# Patient Record
Sex: MALE | Race: WHITE | Employment: OTHER | URBAN - METROPOLITAN AREA
[De-identification: names, ages, dates, MRNs, and addresses within clinical notes are randomized per-mention and may not be internally consistent; named-entity substitution may affect disease eponyms.]

---

## 2018-08-20 ENCOUNTER — OFFICE VISIT (OUTPATIENT)
Dept: PODIATRY CLINIC | Facility: CLINIC | Age: 83
End: 2018-08-20
Payer: MEDICARE

## 2018-08-20 DIAGNOSIS — M79.674 PAIN IN TOES OF BOTH FEET: Primary | ICD-10-CM

## 2018-08-20 DIAGNOSIS — B35.1 ONYCHOMYCOSIS: ICD-10-CM

## 2018-08-20 DIAGNOSIS — M79.675 PAIN IN TOES OF BOTH FEET: Primary | ICD-10-CM

## 2018-08-20 PROCEDURE — 11721 DEBRIDE NAIL 6 OR MORE: CPT | Performed by: PODIATRIST

## 2018-08-20 PROCEDURE — 99202 OFFICE O/P NEW SF 15 MIN: CPT | Performed by: PODIATRIST

## 2018-08-20 RX ORDER — POTASSIUM CHLORIDE 600 MG/1
8 TABLET, FILM COATED, EXTENDED RELEASE ORAL DAILY
COMMUNITY

## 2018-08-20 RX ORDER — HYDROCHLOROTHIAZIDE 25 MG/1
25 TABLET ORAL DAILY
COMMUNITY

## 2018-08-20 RX ORDER — TRAMADOL HYDROCHLORIDE 50 MG/1
50 TABLET ORAL EVERY 6 HOURS PRN
COMMUNITY

## 2018-08-20 RX ORDER — AMLODIPINE BESYLATE 10 MG/1
10 TABLET ORAL DAILY
COMMUNITY

## 2018-08-20 NOTE — PROGRESS NOTES
HPI:    Patient ID: Chong Beal is a 80year old male. HPI  This pleasant 68-year-old male presents to me as a new patient to me and states he was self-referred. The reason he is here is because of pain associated with his toenails.   He states that he Visit:  No prescriptions requested or ordered in this encounter       Imaging & Referrals:  None       RE#[id number]

## 2018-12-17 ENCOUNTER — OFFICE VISIT (OUTPATIENT)
Dept: PODIATRY CLINIC | Facility: CLINIC | Age: 83
End: 2018-12-17
Payer: MEDICARE

## 2018-12-17 DIAGNOSIS — M79.675 PAIN IN TOES OF BOTH FEET: Primary | ICD-10-CM

## 2018-12-17 DIAGNOSIS — M79.674 PAIN IN TOES OF BOTH FEET: Primary | ICD-10-CM

## 2018-12-17 DIAGNOSIS — B35.1 ONYCHOMYCOSIS: ICD-10-CM

## 2018-12-17 PROCEDURE — 11721 DEBRIDE NAIL 6 OR MORE: CPT | Performed by: PODIATRIST

## 2018-12-17 RX ORDER — ASCORBATE CALCIUM 500 MG
TABLET ORAL
COMMUNITY
End: 2020-09-21 | Stop reason: ALTCHOICE

## 2018-12-17 RX ORDER — TIMOLOL MALEATE 5 MG/ML
SOLUTION OPHTHALMIC
COMMUNITY

## 2018-12-17 RX ORDER — MELATONIN: COMMUNITY

## 2018-12-17 RX ORDER — METRONIDAZOLE 7.5 MG/G
GEL TOPICAL
COMMUNITY

## 2018-12-17 RX ORDER — FELODIPINE 5 MG/1
TABLET, EXTENDED RELEASE ORAL
COMMUNITY

## 2018-12-17 RX ORDER — HYPOCHLOROUS ACID/SODIUM CHLOR 0.01 %
SPRAY, NON-AEROSOL (ML) TOPICAL
COMMUNITY
End: 2020-09-21 | Stop reason: ALTCHOICE

## 2018-12-17 NOTE — PROGRESS NOTES
HPI:    Patient ID: Anurag Carmona is a 80year old male. This 43-year-old male presents for care associated with his painful nails. He reports relief by previous treatment. He is accompanied today by his wife.       ROS:     I did review medical status complete debridement of all nails was accomplished today without incident. I reduced nail, subungual debris, and some keratosis. No ulcerations or infections were noted upon debridement.   I dissipate relief will see patient for care if and when symptoms

## 2019-04-22 ENCOUNTER — OFFICE VISIT (OUTPATIENT)
Dept: PODIATRY CLINIC | Facility: CLINIC | Age: 84
End: 2019-04-22
Payer: MEDICARE

## 2019-04-22 DIAGNOSIS — B35.1 ONYCHOMYCOSIS: ICD-10-CM

## 2019-04-22 DIAGNOSIS — M79.675 PAIN IN TOES OF BOTH FEET: Primary | ICD-10-CM

## 2019-04-22 DIAGNOSIS — M79.674 PAIN IN TOES OF BOTH FEET: Primary | ICD-10-CM

## 2019-04-22 PROCEDURE — 11721 DEBRIDE NAIL 6 OR MORE: CPT | Performed by: PODIATRIST

## 2019-04-22 RX ORDER — FLUTICASONE PROPIONATE 50 MCG
SPRAY, SUSPENSION (ML) NASAL
Refills: 1 | COMMUNITY
Start: 2019-04-08 | End: 2020-09-21 | Stop reason: ALTCHOICE

## 2019-04-22 NOTE — PROGRESS NOTES
HPI:    Patient ID: Lyla Rojas is a 80year old male. This 70-year-old male presents with recurrent pain associated with his nails. He reports relief by previous treatment.   He has no other foot related concerns today      ROS:     I did review medi exam patient has palpable discomfort due to the dystrophic nature of his toenails. His nails have thickness, subungual debris separation and change. Some clearly are worse than others there are no present signs of infection.   Patient understands his only

## 2019-08-19 ENCOUNTER — OFFICE VISIT (OUTPATIENT)
Dept: PODIATRY CLINIC | Facility: CLINIC | Age: 84
End: 2019-08-19
Payer: MEDICARE

## 2019-08-19 DIAGNOSIS — B35.1 ONYCHOMYCOSIS: ICD-10-CM

## 2019-08-19 DIAGNOSIS — M79.674 PAIN IN TOES OF BOTH FEET: Primary | ICD-10-CM

## 2019-08-19 DIAGNOSIS — M79.675 PAIN IN TOES OF BOTH FEET: Primary | ICD-10-CM

## 2019-08-19 PROCEDURE — 11721 DEBRIDE NAIL 6 OR MORE: CPT | Performed by: PODIATRIST

## 2019-08-19 NOTE — PROGRESS NOTES
HPI:    Patient ID: Kehinde Mays is a 80year old male. This 80-year-old male presents for care associated with his painful toenails. He has had relief by previous care.       ROS:   I did review medical status medications were noted and he has an [place] nature of his toenails. All nails have thickness, subungual debris separation and change associated with long-standing chronic mycosis. He does not have a nail on the first and fifth toes of the left foot and the fifth toe of the right foot.   Careful and

## 2019-12-23 ENCOUNTER — OFFICE VISIT (OUTPATIENT)
Dept: PODIATRY CLINIC | Facility: CLINIC | Age: 84
End: 2019-12-23
Payer: MEDICARE

## 2019-12-23 DIAGNOSIS — M79.674 PAIN IN TOES OF BOTH FEET: Primary | ICD-10-CM

## 2019-12-23 DIAGNOSIS — B35.1 ONYCHOMYCOSIS: ICD-10-CM

## 2019-12-23 DIAGNOSIS — M79.675 PAIN IN TOES OF BOTH FEET: Primary | ICD-10-CM

## 2019-12-23 PROCEDURE — 11721 DEBRIDE NAIL 6 OR MORE: CPT | Performed by: PODIATRIST

## 2019-12-23 NOTE — PROGRESS NOTES
HPI:    Patient ID: Shey Guillory is a 80year old male. 80-year-old male presents with recurrent pain associated with his toenails. He reports relief by previous treatment.     ROS:   I did review medical status, medications and allergies were noted relief by previous care. Pulses are noted although diminished. All nails have thickness, subungual debris separation and change associated with chronic longstanding mycosis. Patient's only option of care is periodic debridement.   I reduced all 10 toenai

## 2020-07-20 ENCOUNTER — OFFICE VISIT (OUTPATIENT)
Dept: PODIATRY CLINIC | Facility: CLINIC | Age: 85
End: 2020-07-20
Payer: MEDICARE

## 2020-07-20 DIAGNOSIS — B35.1 ONYCHOMYCOSIS: ICD-10-CM

## 2020-07-20 DIAGNOSIS — M79.675 PAIN IN TOES OF BOTH FEET: Primary | ICD-10-CM

## 2020-07-20 DIAGNOSIS — M79.674 PAIN IN TOES OF BOTH FEET: Primary | ICD-10-CM

## 2020-07-20 PROCEDURE — 11721 DEBRIDE NAIL 6 OR MORE: CPT | Performed by: PODIATRIST

## 2020-07-20 NOTE — PROGRESS NOTES
HPI:    Patient ID: Shelby Cantu is a 80year old male. This 80-year-old male presents with recurrent pain associated with his toenails. He reports relief by previous care. Have not seen him for a while because of the pandemic.       ROS:   I did revi left great toe due to previous procedure. All nails are extremely long. They have marked thickness and dystrophy consistent with chronic mycosis. There are no signs of infection. Patient understands options of treatment.   His only option presently is d

## 2020-08-24 ENCOUNTER — OFFICE VISIT (OUTPATIENT)
Dept: PODIATRY CLINIC | Facility: CLINIC | Age: 85
End: 2020-08-24
Payer: MEDICARE

## 2020-08-24 DIAGNOSIS — L84 FOOT CALLUS: ICD-10-CM

## 2020-08-24 DIAGNOSIS — M79.672 LEFT FOOT PAIN: Primary | ICD-10-CM

## 2020-08-24 PROCEDURE — 99212 OFFICE O/P EST SF 10 MIN: CPT | Performed by: PODIATRIST

## 2020-08-24 PROCEDURE — G0463 HOSPITAL OUTPT CLINIC VISIT: HCPCS | Performed by: PODIATRIST

## 2020-08-24 NOTE — PROGRESS NOTES
HPI:    Patient ID: Twin Thacker is a 80year old male. This 55-year-old male has a chief complaint of pain associated with the ball of his left foot. I saw this patient just over a month ago and cared for his toenails.   The pain that he is having n Tab CR Take 8 mEq by mouth daily.        Allergies:  Demerol Hcl [Meperi*    NAUSEA AND VOMITING   PHYSICAL EXAM:   On physical exam when asked he points to the plantar aspect of the ball of the left foot where there is a diffuse rather heavy callus sub-thi

## 2020-09-21 ENCOUNTER — OFFICE VISIT (OUTPATIENT)
Dept: PODIATRY CLINIC | Facility: CLINIC | Age: 85
End: 2020-09-21
Payer: MEDICARE

## 2020-09-21 VITALS — BODY MASS INDEX: 21.91 KG/M2 | WEIGHT: 160 LBS | HEIGHT: 71.5 IN

## 2020-09-21 DIAGNOSIS — M79.675 PAIN IN TOES OF BOTH FEET: Primary | ICD-10-CM

## 2020-09-21 DIAGNOSIS — B35.1 ONYCHOMYCOSIS: ICD-10-CM

## 2020-09-21 DIAGNOSIS — M79.674 PAIN IN TOES OF BOTH FEET: Primary | ICD-10-CM

## 2020-09-21 PROCEDURE — 11721 DEBRIDE NAIL 6 OR MORE: CPT | Performed by: PODIATRIST

## 2020-09-21 RX ORDER — AMLODIPINE BESYLATE AND BENAZEPRIL HYDROCHLORIDE 2.5; 1 MG/1; MG/1
1 CAPSULE ORAL 2 TIMES DAILY
COMMUNITY
Start: 2020-08-10

## 2020-09-21 NOTE — PROGRESS NOTES
HPI:    Patient ID: Meli Sue is a 80year old male. This 5year-old male presents with recurrent pain associated with his toenails. The last time I saw this patient was a couple of months ago.   The pain that is experiencing associated with the dys great toe due to previous procedure. Pulses are noted there is no evidence of edema nor erythema there is no signs of infection. His nails remain dystrophic with changes associated with chronic mycosis.   Careful and complete debridement of each nail was

## 2020-12-21 ENCOUNTER — OFFICE VISIT (OUTPATIENT)
Dept: PODIATRY CLINIC | Facility: CLINIC | Age: 85
End: 2020-12-21
Payer: MEDICARE

## 2020-12-21 DIAGNOSIS — M79.675 PAIN IN TOES OF BOTH FEET: Primary | ICD-10-CM

## 2020-12-21 DIAGNOSIS — M79.674 PAIN IN TOES OF BOTH FEET: Primary | ICD-10-CM

## 2020-12-21 DIAGNOSIS — B35.1 ONYCHOMYCOSIS: ICD-10-CM

## 2020-12-21 PROCEDURE — 11721 DEBRIDE NAIL 6 OR MORE: CPT | Performed by: PODIATRIST

## 2020-12-21 NOTE — PROGRESS NOTES
HPI:    Patient ID: Nestor Perales is a 80year old male. 69-year-old male presents with recurrent pain associated with his toenails. Last time I saw this patient was September 21 of this year. He reports relief by previous care.     ROS:     I did revi signs of infection there is nothing open or draining. Pulses are noted although diminished. Careful and complete debridement of each toenail was accomplished today without incident. I reduced nail, subungual debris, and some keratosis.   No ulcerations o

## 2021-04-19 ENCOUNTER — OFFICE VISIT (OUTPATIENT)
Dept: PODIATRY CLINIC | Facility: CLINIC | Age: 86
End: 2021-04-19
Payer: MEDICARE

## 2021-04-19 DIAGNOSIS — B35.1 ONYCHOMYCOSIS: ICD-10-CM

## 2021-04-19 DIAGNOSIS — M79.674 PAIN IN TOES OF BOTH FEET: Primary | ICD-10-CM

## 2021-04-19 DIAGNOSIS — M79.675 PAIN IN TOES OF BOTH FEET: Primary | ICD-10-CM

## 2021-04-19 PROCEDURE — 11721 DEBRIDE NAIL 6 OR MORE: CPT | Performed by: PODIATRIST

## 2021-04-19 NOTE — PROGRESS NOTES
HPI:    Patient ID: Mario Winters is a 80year old male. This 20-year-old male presents for care associated with his painful toenails. The last time I saw this patient was 24 December last year. He reports relief by previous care.     ROS:   I did go options of treatment. Careful and complete debridement of all 9 toenails was accomplished today without incident. I reduced nail, subungual debris, and some keratosis. No ulcerations or infections were noted upon debridement.   I anticipate relief by [facility]

## 2021-08-31 ENCOUNTER — OFFICE VISIT (OUTPATIENT)
Dept: PODIATRY CLINIC | Facility: CLINIC | Age: 86
End: 2021-08-31
Payer: MEDICARE

## 2021-08-31 DIAGNOSIS — M79.675 PAIN IN TOES OF BOTH FEET: Primary | ICD-10-CM

## 2021-08-31 DIAGNOSIS — B35.1 ONYCHOMYCOSIS: ICD-10-CM

## 2021-08-31 DIAGNOSIS — M79.674 PAIN IN TOES OF BOTH FEET: Primary | ICD-10-CM

## 2021-08-31 PROCEDURE — 11721 DEBRIDE NAIL 6 OR MORE: CPT | Performed by: PODIATRIST

## 2021-08-31 NOTE — PROGRESS NOTES
HPI:    Patient ID: Claudine Vásquez is a 80year old male. 80-year-old male presents with recurrent pain associated with all of his nails. I saw this patient about 4 months ago when he states that he had relief by previous care.       ROS:     There are n accomplished today without incident. I reduced nail, subungual debris, and some keratosis. No ulcerations or infections were noted upon debridement. Anticipate relief by this care and will see patient for treatment if and when symptoms redevelop.   He is

## 2021-12-14 ENCOUNTER — OFFICE VISIT (OUTPATIENT)
Dept: PODIATRY CLINIC | Facility: CLINIC | Age: 86
End: 2021-12-14
Payer: MEDICARE

## 2021-12-14 DIAGNOSIS — B35.1 ONYCHOMYCOSIS: ICD-10-CM

## 2021-12-14 DIAGNOSIS — M79.674 PAIN IN TOES OF BOTH FEET: Primary | ICD-10-CM

## 2021-12-14 DIAGNOSIS — M79.675 PAIN IN TOES OF BOTH FEET: Primary | ICD-10-CM

## 2021-12-14 PROCEDURE — 11721 DEBRIDE NAIL 6 OR MORE: CPT | Performed by: PODIATRIST

## 2021-12-14 NOTE — PROGRESS NOTES
This 80-year-old male presents with recurrent symptoms associated with all of his nails. The last time I saw this patient was HPI:    Patient ID: Timi Ornelas is a 80year old male. Last time I saw this patient was August 31.   He reports relief by pre and therefore today careful and complete debridement of all 10 nails. Careful and complete debridement of each nail was accomplished today without incident. I reduced nail, subungual debris, and some keratosis. No ulcerations or infections were noted.

## 2022-04-27 ENCOUNTER — OFFICE VISIT (OUTPATIENT)
Dept: PODIATRY CLINIC | Facility: CLINIC | Age: 87
End: 2022-04-27
Payer: MEDICARE

## 2022-04-27 DIAGNOSIS — M79.674 PAIN IN TOES OF BOTH FEET: Primary | ICD-10-CM

## 2022-04-27 DIAGNOSIS — B35.1 ONYCHOMYCOSIS: ICD-10-CM

## 2022-04-27 DIAGNOSIS — M79.675 PAIN IN TOES OF BOTH FEET: Primary | ICD-10-CM

## 2022-04-27 PROCEDURE — 11721 DEBRIDE NAIL 6 OR MORE: CPT | Performed by: PODIATRIST

## 2022-08-02 ENCOUNTER — OFFICE VISIT (OUTPATIENT)
Dept: PODIATRY CLINIC | Facility: CLINIC | Age: 87
End: 2022-08-02
Payer: MEDICARE

## 2022-08-02 DIAGNOSIS — B35.1 ONYCHOMYCOSIS: ICD-10-CM

## 2022-08-02 DIAGNOSIS — M79.675 PAIN IN TOES OF BOTH FEET: Primary | ICD-10-CM

## 2022-08-02 DIAGNOSIS — M79.674 PAIN IN TOES OF BOTH FEET: Primary | ICD-10-CM

## 2022-08-02 PROCEDURE — 11721 DEBRIDE NAIL 6 OR MORE: CPT | Performed by: PODIATRIST

## 2022-11-15 ENCOUNTER — OFFICE VISIT (OUTPATIENT)
Dept: PODIATRY CLINIC | Facility: CLINIC | Age: 87
End: 2022-11-15
Payer: MEDICARE

## 2022-11-15 DIAGNOSIS — M79.674 PAIN IN TOES OF BOTH FEET: Primary | ICD-10-CM

## 2022-11-15 DIAGNOSIS — M79.675 PAIN IN TOES OF BOTH FEET: Primary | ICD-10-CM

## 2022-11-15 DIAGNOSIS — B35.1 ONYCHOMYCOSIS: ICD-10-CM

## 2022-11-15 PROCEDURE — 11721 DEBRIDE NAIL 6 OR MORE: CPT | Performed by: PODIATRIST

## 2023-02-27 ENCOUNTER — OFFICE VISIT (OUTPATIENT)
Dept: PODIATRY CLINIC | Facility: CLINIC | Age: 88
End: 2023-02-27

## 2023-02-27 DIAGNOSIS — B35.1 ONYCHOMYCOSIS: ICD-10-CM

## 2023-02-27 DIAGNOSIS — M79.675 PAIN IN TOES OF BOTH FEET: Primary | ICD-10-CM

## 2023-02-27 DIAGNOSIS — M79.674 PAIN IN TOES OF BOTH FEET: Primary | ICD-10-CM

## 2023-02-27 PROCEDURE — 99213 OFFICE O/P EST LOW 20 MIN: CPT | Performed by: STUDENT IN AN ORGANIZED HEALTH CARE EDUCATION/TRAINING PROGRAM

## 2023-02-27 RX ORDER — BENAZEPRIL HYDROCHLORIDE 20 MG/1
TABLET ORAL
COMMUNITY

## 2023-02-27 RX ORDER — METOPROLOL SUCCINATE 50 MG/1
50 TABLET, EXTENDED RELEASE ORAL DAILY
COMMUNITY
Start: 2023-01-13

## 2023-07-03 ENCOUNTER — HOSPITAL ENCOUNTER (OUTPATIENT)
Dept: GENERAL RADIOLOGY | Age: 88
Discharge: HOME | End: 2023-07-03
Attending: STUDENT IN AN ORGANIZED HEALTH CARE EDUCATION/TRAINING PROGRAM
Payer: MEDICARE

## 2023-07-03 ENCOUNTER — OFFICE VISIT (OUTPATIENT)
Dept: PODIATRY CLINIC | Facility: CLINIC | Age: 88
End: 2023-07-03

## 2023-07-03 ENCOUNTER — LAB ENCOUNTER (OUTPATIENT)
Dept: LAB | Age: 88
End: 2023-07-03
Attending: STUDENT IN AN ORGANIZED HEALTH CARE EDUCATION/TRAINING PROGRAM
Payer: MEDICARE

## 2023-07-03 DIAGNOSIS — L03.115 CELLULITIS OF RIGHT FOOT: Primary | ICD-10-CM

## 2023-07-03 DIAGNOSIS — M10.071 ACUTE IDIOPATHIC GOUT OF RIGHT FOOT: ICD-10-CM

## 2023-07-03 DIAGNOSIS — L03.115 CELLULITIS OF RIGHT FOOT: ICD-10-CM

## 2023-07-03 LAB
BASOPHILS # BLD AUTO: 0.07 X10(3) UL (ref 0–0.2)
BASOPHILS NFR BLD AUTO: 0.9 %
EOSINOPHIL # BLD AUTO: 0.17 X10(3) UL (ref 0–0.7)
EOSINOPHIL NFR BLD AUTO: 2.1 %
ERYTHROCYTE [DISTWIDTH] IN BLOOD BY AUTOMATED COUNT: 12.8 %
HCT VFR BLD AUTO: 38.4 %
HGB BLD-MCNC: 11.9 G/DL
IMM GRANULOCYTES # BLD AUTO: 0.04 X10(3) UL (ref 0–1)
IMM GRANULOCYTES NFR BLD: 0.5 %
LYMPHOCYTES # BLD AUTO: 0.78 X10(3) UL (ref 1–4)
LYMPHOCYTES NFR BLD AUTO: 9.8 %
MCH RBC QN AUTO: 30.6 PG (ref 26–34)
MCHC RBC AUTO-ENTMCNC: 31 G/DL (ref 31–37)
MCV RBC AUTO: 98.7 FL
MONOCYTES # BLD AUTO: 0.87 X10(3) UL (ref 0.1–1)
MONOCYTES NFR BLD AUTO: 10.9 %
NEUTROPHILS # BLD AUTO: 6.05 X10 (3) UL (ref 1.5–7.7)
NEUTROPHILS # BLD AUTO: 6.05 X10(3) UL (ref 1.5–7.7)
NEUTROPHILS NFR BLD AUTO: 75.8 %
PLATELET # BLD AUTO: 214 10(3)UL (ref 150–450)
RBC # BLD AUTO: 3.89 X10(6)UL
URATE SERPL-MCNC: 11.3 MG/DL
WBC # BLD AUTO: 8 X10(3) UL (ref 4–11)

## 2023-07-03 PROCEDURE — 36415 COLL VENOUS BLD VENIPUNCTURE: CPT

## 2023-07-03 PROCEDURE — 85025 COMPLETE CBC W/AUTO DIFF WBC: CPT

## 2023-07-03 PROCEDURE — 84550 ASSAY OF BLOOD/URIC ACID: CPT

## 2023-07-03 PROCEDURE — 99213 OFFICE O/P EST LOW 20 MIN: CPT | Performed by: STUDENT IN AN ORGANIZED HEALTH CARE EDUCATION/TRAINING PROGRAM

## 2023-07-03 PROCEDURE — 73630 X-RAY EXAM OF FOOT: CPT | Performed by: STUDENT IN AN ORGANIZED HEALTH CARE EDUCATION/TRAINING PROGRAM

## 2023-07-03 RX ORDER — AMOXICILLIN AND CLAVULANATE POTASSIUM 875; 125 MG/1; MG/1
1 TABLET, FILM COATED ORAL 2 TIMES DAILY
Qty: 30 TABLET | Refills: 0 | Status: SHIPPED | OUTPATIENT
Start: 2023-07-03

## 2023-07-05 RX ORDER — PREDNISONE 20 MG/1
20 TABLET ORAL DAILY
Qty: 5 TABLET | Refills: 0 | Status: SHIPPED | OUTPATIENT
Start: 2023-07-05 | End: 2023-07-10

## 2023-09-01 RX ORDER — AMOXICILLIN AND CLAVULANATE POTASSIUM 875; 125 MG/1; MG/1
1 TABLET, FILM COATED ORAL 2 TIMES DAILY
Qty: 30 TABLET | Refills: 0 | OUTPATIENT
Start: 2023-09-01

## 2023-10-09 ENCOUNTER — OFFICE VISIT (OUTPATIENT)
Dept: PODIATRY CLINIC | Facility: CLINIC | Age: 88
End: 2023-10-09

## 2023-10-09 DIAGNOSIS — I87.2 VENOUS STASIS DERMATITIS OF LEFT LOWER EXTREMITY: ICD-10-CM

## 2023-10-09 DIAGNOSIS — L03.116 CELLULITIS OF LEFT FOOT: Primary | ICD-10-CM

## 2023-10-09 DIAGNOSIS — B35.1 ONYCHOMYCOSIS: ICD-10-CM

## 2023-10-09 PROCEDURE — 99213 OFFICE O/P EST LOW 20 MIN: CPT | Performed by: STUDENT IN AN ORGANIZED HEALTH CARE EDUCATION/TRAINING PROGRAM

## 2023-10-09 RX ORDER — CEPHALEXIN 500 MG/1
500 CAPSULE ORAL 3 TIMES DAILY
Qty: 21 CAPSULE | Refills: 0 | Status: SHIPPED | OUTPATIENT
Start: 2023-10-09

## 2023-10-09 RX ORDER — HYDRALAZINE HYDROCHLORIDE 25 MG/1
1 TABLET, FILM COATED ORAL 2 TIMES DAILY
COMMUNITY

## 2023-10-09 RX ORDER — METOPROLOL SUCCINATE 25 MG/1
25 TABLET, EXTENDED RELEASE ORAL DAILY
COMMUNITY
Start: 2023-09-21

## 2023-10-09 RX ORDER — ALLOPURINOL 100 MG/1
2 TABLET ORAL DAILY
COMMUNITY

## 2023-10-09 NOTE — PROGRESS NOTES
[facility] Podiatry  Progress Note      Lopez Santacruz is a 80year old male. Patient presents with:  Toenail Care: Here w/ daughter- Nail trim and foot check- also L foot check for skin thickening- rates pain 6/10 only when putting pressure on it            HPI:       Patient is a pleasant 80year-old male presents to clinic for bilateral check. Patient admits elongated toenais, left foot callus and swelling with redness to left lower extremity. He does use compression socks for edema control. The left leg has been using and there is redness, which has been improving. Allergies: Demerol Hcl [Meperidine] and Demerol    Current Outpatient Medications   Medication Sig Dispense Refill    metoprolol succinate ER 25 MG Oral Tablet 24 Hr Take 1 tablet (25 mg total) by mouth daily. hydrALAZINE 25 MG Oral Tab Take 1 tablet (25 mg total) by mouth 2 (two) times daily. allopurinol 100 MG Oral Tab Take 2 tablets (200 mg total) by mouth daily. amoxicillin clavulanate 875-125 MG Oral Tab Take 1 tablet by mouth 2 (two) times daily. (Patient not taking: Reported on 10/9/2023) 30 tablet 0    Benazepril HCl 20 MG Oral Tab benazepril 20 mg tablet (Patient not taking: Reported on 10/9/2023)      metoprolol succinate ER 50 MG Oral Tablet 24 Hr Take 1 tablet (50 mg total) by mouth daily. (Patient not taking: Reported on 10/9/2023)      amLODIPine Besy-Benazepril HCl 2.5-10 MG Oral Cap Take 1 capsule by mouth 2 (two) times daily. 20 mg tab bid (Patient not taking: Reported on 10/9/2023)      Ascorbic Acid (VITAMIN C OR) Vitamin C 500 mg tablet   Take 1 tablet every day by oral route. Timolol Maleate 0.5 % Ophthalmic Gel Forming Solution timolol maleate 0.5 % eye gel forming solution      SELENIUM OR selenium 200 mcg tablet   Take 1 tablet every day by oral route. Vitamin D3 1000 units Oral Tab Vitamin D3 1,000 unit tablet   Take 1 tablet every day by oral route.       aspirin 81 MG Oral Tab Asprin Ec Low Dose 81 mg tablet,delayed release   Take 1 tablet every day by oral route. MetroNIDAZOLE 0.75 % External Gel metronidazole 0.75 % topical gel (Patient not taking: Reported on 10/9/2023)      Felodipine ER 5 MG Oral Tablet 24 Hr felodipine ER 5 mg tablet,extended release 24 hr (Patient not taking: Reported on 10/9/2023)      hydrochlorothiazide 25 MG Oral Tab Take 1 tablet (25 mg total) by mouth daily. (Patient not taking: Reported on 10/9/2023)      TraMADol HCl 50 MG Oral Tab Take 1 tablet (50 mg total) by mouth every 6 (six) hours as needed for Pain. (Patient not taking: Reported on 10/9/2023)      Potassium Chloride ER 8 MEQ Oral Tab CR Take 1 tablet (8 mEq total) by mouth daily. (Patient not taking: Reported on 10/9/2023)        History reviewed. No pertinent past medical history. History reviewed. No pertinent surgical history. History reviewed. No pertinent family history. Social History    Socioeconomic History      Marital status:     Tobacco Use      Smoking status: Never      Smokeless tobacco: Never    Vaping Use      Vaping Use: Never used    Substance and Sexual Activity      Alcohol use: No      Drug use: No          REVIEW OF SYSTEMS:     Denies nause, fever, chills  No calf pain  Denies chest pain or SOB      EXAM:   There were no vitals taken for this visit. GENERAL: well developed, well nourished, in no apparent distress  EXTREMITIES:   1. Integument: Normal skin temperature and turgor. Erthema and warmth to left lateral lower leg with clear oozing from venous stasis. HPK tissue to plantar left foot  2. Vascular: Dorsalis pedis two out of four bilateral and posterior tibial pulses two out of   four bilateral, capillary refill normal.   3. Musculoskeletal: All muscle groups are graded 5 out of 5 in the foot and ankle.     4. Neurological: Normal sharp dull sensation; reflexes normal.             ASSESSMENT AND PLAN:   Diagnoses and all orders for this visit:    Cellulitis of left foot    Onychomycosis    Venous stasis dermatitis of left lower extremity        Plan:       -Patient examined, chart history reviewed. -Discussed importance of proper pedal hygiene, regular foot checks, and tight glucose control.  -Sharply debrided nails x10 with a sterile nail nipper achieving a 20% reduction in thickness and length, without incident.   -Pared HPK tissue with bleeding expressed post debridement. Hemostasis achieved with silver nitrate, pressure,gauze and coban.   -Left lateral leg dressed with betadine, gauze, and ace for edema control.   -Rx for mupirocin to be applied to left leg venous stasis and take oral keflex as directed  -Ambulate with supportive shoes and inserts and avoid walking barefoot.  -Educated patient on acute signs of infection advised patient to seek immediate medical attention if symptoms arise. RTC in 3 months       The patient indicates understanding of these issues and agrees to the plan.         Lucie Barone DPM

## 2024-02-05 ENCOUNTER — OFFICE VISIT (OUTPATIENT)
Dept: PODIATRY CLINIC | Facility: CLINIC | Age: 89
End: 2024-02-05

## 2024-02-05 DIAGNOSIS — R26.81 GAIT INSTABILITY: ICD-10-CM

## 2024-02-05 DIAGNOSIS — B35.1 ONYCHOMYCOSIS: Primary | ICD-10-CM

## 2024-02-05 PROCEDURE — 99213 OFFICE O/P EST LOW 20 MIN: CPT | Performed by: STUDENT IN AN ORGANIZED HEALTH CARE EDUCATION/TRAINING PROGRAM

## 2024-02-05 RX ORDER — AMLODIPINE BESYLATE 2.5 MG/1
2.5 TABLET ORAL DAILY
COMMUNITY
Start: 2023-12-04

## 2024-02-05 NOTE — PROGRESS NOTES
[facility] Podiatry  Progress Note      Vikram Covarrubias is a 89 year old male.   Chief Complaint   Patient presents with    Toenail Care     Here w/ daughter- Nail trim and foot check f/u- L foot callus check - rates pain 3-5/10 on and off             HPI:       Patient is a pleasant 89-year-old male presents to clinic for bilateral check.  Patient admits elongated toenails. Calluses bothers him mostly at night     Allergies: Demerol hcl [meperidine] and Demerol    Current Outpatient Medications   Medication Sig Dispense Refill    amLODIPine 2.5 MG Oral Tab Take 1 tablet (2.5 mg total) by mouth daily.      hydrALAZINE 25 MG Oral Tab Take 1 tablet (25 mg total) by mouth 2 (two) times daily.      allopurinol 100 MG Oral Tab Take 2 tablets (200 mg total) by mouth daily.      metoprolol succinate ER 25 MG Oral Tablet 24 Hr Take 1 tablet (25 mg total) by mouth daily. (Patient not taking: Reported on 2/5/2024)      cephalexin 500 MG Oral Cap Take 1 capsule (500 mg total) by mouth 3 (three) times daily. 21 capsule 0    mupirocin 2 % External Ointment Apply 1 Application topically 2 (two) times daily. 30 g 3    amoxicillin clavulanate 875-125 MG Oral Tab Take 1 tablet by mouth 2 (two) times daily. (Patient not taking: Reported on 10/9/2023) 30 tablet 0    Benazepril HCl 20 MG Oral Tab benazepril 20 mg tablet (Patient not taking: Reported on 10/9/2023)      metoprolol succinate ER 50 MG Oral Tablet 24 Hr Take 1 tablet (50 mg total) by mouth daily. (Patient not taking: Reported on 10/9/2023)      amLODIPine Besy-Benazepril HCl 2.5-10 MG Oral Cap Take 1 capsule by mouth 2 (two) times daily. 20 mg tab bid (Patient not taking: Reported on 10/9/2023)      Ascorbic Acid (VITAMIN C OR) Vitamin C 500 mg tablet   Take 1 tablet every day by oral route.      Timolol Maleate 0.5 % Ophthalmic Gel Forming Solution timolol maleate 0.5 % eye gel forming solution      SELENIUM OR selenium 200 mcg tablet   Take 1 tablet every day by oral  route.      Vitamin D3 1000 units Oral Tab Vitamin D3 1,000 unit tablet   Take 1 tablet every day by oral route.      aspirin 81 MG Oral Tab Asprin Ec Low Dose 81 mg tablet,delayed release   Take 1 tablet every day by oral route.      MetroNIDAZOLE 0.75 % External Gel metronidazole 0.75 % topical gel (Patient not taking: Reported on 10/9/2023)      Felodipine ER 5 MG Oral Tablet 24 Hr felodipine ER 5 mg tablet,extended release 24 hr (Patient not taking: Reported on 10/9/2023)      hydrochlorothiazide 25 MG Oral Tab Take 1 tablet (25 mg total) by mouth daily. (Patient not taking: Reported on 10/9/2023)      TraMADol HCl 50 MG Oral Tab Take 1 tablet (50 mg total) by mouth every 6 (six) hours as needed for Pain. (Patient not taking: Reported on 10/9/2023)      Potassium Chloride ER 8 MEQ Oral Tab CR Take 1 tablet (8 mEq total) by mouth daily. (Patient not taking: Reported on 10/9/2023)        History reviewed. No pertinent past medical history.   History reviewed. No pertinent surgical history.   History reviewed. No pertinent family history.   Social History     Socioeconomic History    Marital status:    Tobacco Use    Smoking status: Never    Smokeless tobacco: Never   Vaping Use    Vaping Use: Never used   Substance and Sexual Activity    Alcohol use: No    Drug use: No           REVIEW OF SYSTEMS:     Denies nause, fever, chills  No calf pain  Denies chest pain or SOB      EXAM:   There were no vitals taken for this visit.  GENERAL: well developed, well nourished, in no apparent distress  EXTREMITIES:   1. Integument: Normal skin temperature and turgor. Toenails x9 are elongated, thickened and discolored with subungal derbi.     2. Vascular: Dorsalis pedis two out of four bilateral and posterior tibial pulses two out of   four bilateral, capillary refill normal.   3. Musculoskeletal: All muscle groups are graded 5 out of 5 in the foot and ankle.    4. Neurological: Normal sharp dull sensation; reflexes  normal.             ASSESSMENT AND PLAN:   Diagnoses and all orders for this visit:    Onychomycosis    Gait instability        Plan:       -Patient examined, chart history reviewed.  -Discussed importance of proper pedal hygiene, regular foot checks, and tight glucose control.  -Sharply debrided nails x10 with a sterile nail nipper achieving a 20% reduction in thickness and length, without incident.   -Ambulate with supportive shoes and inserts and avoid walking barefoot.  -Educated patient on acute signs of infection advised patient to seek immediate medical attention if symptoms arise.    RTC in 3 months       The patient indicates understanding of these issues and agrees to the plan.        Surekha Longoria DPM    - - -

## 2024-05-20 ENCOUNTER — OFFICE VISIT (OUTPATIENT)
Dept: PODIATRY CLINIC | Facility: CLINIC | Age: 89
End: 2024-05-20

## 2024-05-20 DIAGNOSIS — B35.1 ONYCHOMYCOSIS: Primary | ICD-10-CM

## 2024-05-20 DIAGNOSIS — R26.81 GAIT INSTABILITY: ICD-10-CM

## 2024-05-20 DIAGNOSIS — I87.2 VENOUS STASIS DERMATITIS OF LEFT LOWER EXTREMITY: ICD-10-CM

## 2024-05-20 PROCEDURE — 99213 OFFICE O/P EST LOW 20 MIN: CPT | Performed by: STUDENT IN AN ORGANIZED HEALTH CARE EDUCATION/TRAINING PROGRAM

## 2024-05-20 NOTE — PROGRESS NOTES
[facility] Podiatry  Progress Note      Vikram Covarrubias is a 89 year old male.   Chief Complaint   Patient presents with    Toenail Care     3MO  f/u - Pt here for nail trim and foot check. No pain or concerns.             HPI:       Patient is a pleasant 89-year-old male presents to clinic for bilateral check.  Patient admits elongated toenails. Calluses bothers him mostly at night     Allergies: Demerol hcl [meperidine] and Demerol    Current Outpatient Medications   Medication Sig Dispense Refill    amLODIPine 2.5 MG Oral Tab Take 1 tablet (2.5 mg total) by mouth daily.      hydrALAZINE 25 MG Oral Tab Take 1 tablet (25 mg total) by mouth 2 (two) times daily.      allopurinol 100 MG Oral Tab Take 2 tablets (200 mg total) by mouth daily.      metoprolol succinate ER 25 MG Oral Tablet 24 Hr Take 1 tablet (25 mg total) by mouth daily.      cephalexin 500 MG Oral Cap Take 1 capsule (500 mg total) by mouth 3 (three) times daily. 21 capsule 0    mupirocin 2 % External Ointment Apply 1 Application topically 2 (two) times daily. 30 g 3    Benazepril HCl 20 MG Oral Tab       metoprolol succinate ER 50 MG Oral Tablet 24 Hr Take 1 tablet (50 mg total) by mouth daily.      amLODIPine Besy-Benazepril HCl 2.5-10 MG Oral Cap Take 1 capsule by mouth 2 (two) times daily. 20 mg tab bid      Ascorbic Acid (VITAMIN C OR) Vitamin C 500 mg tablet   Take 1 tablet every day by oral route.      Timolol Maleate 0.5 % Ophthalmic Gel Forming Solution timolol maleate 0.5 % eye gel forming solution      SELENIUM OR selenium 200 mcg tablet   Take 1 tablet every day by oral route.      Vitamin D3 1000 units Oral Tab Vitamin D3 1,000 unit tablet   Take 1 tablet every day by oral route.      aspirin 81 MG Oral Tab Asprin Ec Low Dose 81 mg tablet,delayed release   Take 1 tablet every day by oral route.      MetroNIDAZOLE 0.75 % External Gel       Felodipine ER 5 MG Oral Tablet 24 Hr       hydrochlorothiazide 25 MG Oral Tab Take 1 tablet (25 mg  total) by mouth daily.      TraMADol HCl 50 MG Oral Tab Take 1 tablet (50 mg total) by mouth every 6 (six) hours as needed for Pain.      Potassium Chloride ER 8 MEQ Oral Tab CR Take 1 tablet (8 mEq total) by mouth daily.      amoxicillin clavulanate 875-125 MG Oral Tab Take 1 tablet by mouth 2 (two) times daily. (Patient not taking: Reported on 10/9/2023) 30 tablet 0      History reviewed. No pertinent past medical history.   History reviewed. No pertinent surgical history.   History reviewed. No pertinent family history.   Social History     Socioeconomic History    Marital status:    Tobacco Use    Smoking status: Never    Smokeless tobacco: Never   Vaping Use    Vaping status: Never Used   Substance and Sexual Activity    Alcohol use: No    Drug use: No           REVIEW OF SYSTEMS:     Denies nause, fever, chills  No calf pain  Denies chest pain or SOB      EXAM:   There were no vitals taken for this visit.  GENERAL: well developed, well nourished, in no apparent distress  EXTREMITIES:   1. Integument: Normal skin temperature and turgor. Toenails x9 are elongated, thickened and discolored with subungal derbi.     2. Vascular: Dorsalis pedis two out of four bilateral and posterior tibial pulses two out of   four bilateral, capillary refill normal.   3. Musculoskeletal: All muscle groups are graded 5 out of 5 in the foot and ankle.    4. Neurological: Normal sharp dull sensation; reflexes normal.             ASSESSMENT AND PLAN:   Diagnoses and all orders for this visit:    Onychomycosis    Gait instability    Venous stasis dermatitis of left lower extremity          Plan:       -Patient examined, chart history reviewed.  -Discussed importance of proper pedal hygiene, regular foot checks, and tight glucose control.  -Sharply debrided nails x10 with a sterile nail nipper achieving a 20% reduction in thickness and length, without incident.   -Ambulate with supportive shoes and inserts and avoid walking  barefoot.  -Educated patient on acute signs of infection advised patient to seek immediate medical attention if symptoms arise.    RTC in 3 months       The patient indicates understanding of these issues and agrees to the plan.        Surekha Longoria DPM

## 2024-08-26 ENCOUNTER — OFFICE VISIT (OUTPATIENT)
Dept: PODIATRY CLINIC | Facility: CLINIC | Age: 89
End: 2024-08-26

## 2024-08-26 DIAGNOSIS — M79.674 PAIN IN TOES OF BOTH FEET: ICD-10-CM

## 2024-08-26 DIAGNOSIS — M79.675 PAIN IN TOES OF BOTH FEET: ICD-10-CM

## 2024-08-26 DIAGNOSIS — L84 CALLUS OF FOOT: ICD-10-CM

## 2024-08-26 DIAGNOSIS — B35.1 ONYCHOMYCOSIS: Primary | ICD-10-CM

## 2024-08-26 DIAGNOSIS — R26.81 GAIT INSTABILITY: ICD-10-CM

## 2024-08-26 PROCEDURE — 99213 OFFICE O/P EST LOW 20 MIN: CPT | Performed by: STUDENT IN AN ORGANIZED HEALTH CARE EDUCATION/TRAINING PROGRAM

## 2024-08-26 NOTE — PROGRESS NOTES
[facility] Podiatry  Progress Note      Vikram Covarrubias is a 89 year old male.   Chief Complaint   Patient presents with    Toenail Care     F/u toenail care, intermittent pain 7/10.             HPI:       Patient is a pleasant 89-year-old male presents to clinic for bilateral check.  Patient admits elongated toenails and painful callus to his left foot.     Allergies: Demerol hcl [meperidine] and Demerol    Current Outpatient Medications   Medication Sig Dispense Refill    amLODIPine 2.5 MG Oral Tab Take 1 tablet (2.5 mg total) by mouth daily.      hydrALAZINE 25 MG Oral Tab Take 1 tablet (25 mg total) by mouth 2 (two) times daily.      allopurinol 100 MG Oral Tab Take 2 tablets (200 mg total) by mouth daily.      metoprolol succinate ER 25 MG Oral Tablet 24 Hr Take 1 tablet (25 mg total) by mouth daily.      cephalexin 500 MG Oral Cap Take 1 capsule (500 mg total) by mouth 3 (three) times daily. 21 capsule 0    mupirocin 2 % External Ointment Apply 1 Application topically 2 (two) times daily. 30 g 3    amoxicillin clavulanate 875-125 MG Oral Tab Take 1 tablet by mouth 2 (two) times daily. 30 tablet 0    Benazepril HCl 20 MG Oral Tab       metoprolol succinate ER 50 MG Oral Tablet 24 Hr Take 1 tablet (50 mg total) by mouth daily.      amLODIPine Besy-Benazepril HCl 2.5-10 MG Oral Cap Take 1 capsule by mouth 2 (two) times daily. 20 mg tab bid      Ascorbic Acid (VITAMIN C OR) Vitamin C 500 mg tablet   Take 1 tablet every day by oral route.      Timolol Maleate 0.5 % Ophthalmic Gel Forming Solution timolol maleate 0.5 % eye gel forming solution      SELENIUM OR selenium 200 mcg tablet   Take 1 tablet every day by oral route.      Vitamin D3 1000 units Oral Tab Vitamin D3 1,000 unit tablet   Take 1 tablet every day by oral route.      aspirin 81 MG Oral Tab Asprin Ec Low Dose 81 mg tablet,delayed release   Take 1 tablet every day by oral route.      MetroNIDAZOLE 0.75 % External Gel       Felodipine ER 5 MG Oral  Tablet 24 Hr       hydrochlorothiazide 25 MG Oral Tab Take 1 tablet (25 mg total) by mouth daily.      TraMADol HCl 50 MG Oral Tab Take 1 tablet (50 mg total) by mouth every 6 (six) hours as needed for Pain.      Potassium Chloride ER 8 MEQ Oral Tab CR Take 1 tablet (8 mEq total) by mouth daily.        History reviewed. No pertinent past medical history.   History reviewed. No pertinent surgical history.   History reviewed. No pertinent family history.   Social History     Socioeconomic History    Marital status:    Tobacco Use    Smoking status: Never    Smokeless tobacco: Never   Vaping Use    Vaping status: Never Used   Substance and Sexual Activity    Alcohol use: No    Drug use: No           REVIEW OF SYSTEMS:     Denies nause, fever, chills  No calf pain  Denies chest pain or SOB      EXAM:   There were no vitals taken for this visit.  GENERAL: well developed, well nourished, in no apparent distress  EXTREMITIES:   1. Integument: Normal skin temperature and turgor. Toenails x9 are elongated, thickened and discolored with subungal derbi. HPK to left plantar foot.     2. Vascular: Dorsalis pedis two out of four bilateral and posterior tibial pulses two out of   four bilateral, capillary refill normal.   3. Musculoskeletal: All muscle groups are graded 5 out of 5 in the foot and ankle.    4. Neurological: Normal sharp dull sensation; reflexes normal.             ASSESSMENT AND PLAN:   Diagnoses and all orders for this visit:    Onychomycosis    Gait instability    Pain in toes of both feet    Callus of foot            Plan:       -Patient examined, chart history reviewed.  -Discussed importance of proper pedal hygiene, regular foot checks, and tight glucose control.  -Sharply debrided nails x10 with a sterile nail nipper achieving a 20% reduction in thickness and length, without incident.   -Pared HPK to left foot with #15 blade with no incident.   -Ambulate with supportive shoes and inserts and avoid  walking barefoot.  -Educated patient on acute signs of infection advised patient to seek immediate medical attention if symptoms arise.    RTC in 3 months       The patient indicates understanding of these issues and agrees to the plan.        Surekha Longoria DPM

## 2025-01-06 ENCOUNTER — OFFICE VISIT (OUTPATIENT)
Dept: PODIATRY CLINIC | Facility: CLINIC | Age: OVER 89
End: 2025-01-06

## 2025-01-06 DIAGNOSIS — L84 CALLUS OF FOOT: ICD-10-CM

## 2025-01-06 DIAGNOSIS — L03.031 CELLULITIS OF RIGHT TOE: ICD-10-CM

## 2025-01-06 DIAGNOSIS — R26.81 GAIT INSTABILITY: Primary | ICD-10-CM

## 2025-01-06 DIAGNOSIS — M79.675 PAIN IN TOES OF BOTH FEET: ICD-10-CM

## 2025-01-06 DIAGNOSIS — M79.674 PAIN IN TOES OF BOTH FEET: ICD-10-CM

## 2025-01-06 PROCEDURE — 99214 OFFICE O/P EST MOD 30 MIN: CPT | Performed by: STUDENT IN AN ORGANIZED HEALTH CARE EDUCATION/TRAINING PROGRAM

## 2025-01-06 RX ORDER — MUPIROCIN 20 MG/G
1 OINTMENT TOPICAL 2 TIMES DAILY
Qty: 30 G | Refills: 0 | Status: SHIPPED | OUTPATIENT
Start: 2025-01-06

## 2025-01-06 RX ORDER — CEPHALEXIN 500 MG/1
500 CAPSULE ORAL 3 TIMES DAILY
Qty: 21 CAPSULE | Refills: 0 | Status: SHIPPED | OUTPATIENT
Start: 2025-01-06

## 2025-01-06 NOTE — PROGRESS NOTES
[facility] Podiatry  Progress Note      Vikram Covarrubias is a 89 year old male.   Chief Complaint   Patient presents with    Toenail Care     F/u Toenail and callus care with intermittent pain 3/10.             HPI:       Patient is a pleasant 89-year-old male presents to clinic for bilateral check.  Patient admits elongated toenails and painful callus to his left foot.     Allergies: Demerol hcl [meperidine] and Demerol    Current Outpatient Medications   Medication Sig Dispense Refill    amLODIPine 2.5 MG Oral Tab Take 1 tablet (2.5 mg total) by mouth daily.      hydrALAZINE 25 MG Oral Tab Take 1 tablet (25 mg total) by mouth 2 (two) times daily.      allopurinol 100 MG Oral Tab Take 2 tablets (200 mg total) by mouth daily.      metoprolol succinate ER 25 MG Oral Tablet 24 Hr Take 1 tablet (25 mg total) by mouth daily.      cephalexin 500 MG Oral Cap Take 1 capsule (500 mg total) by mouth 3 (three) times daily. 21 capsule 0    mupirocin 2 % External Ointment Apply 1 Application topically 2 (two) times daily. 30 g 3    amoxicillin clavulanate 875-125 MG Oral Tab Take 1 tablet by mouth 2 (two) times daily. 30 tablet 0    Benazepril HCl 20 MG Oral Tab       metoprolol succinate ER 50 MG Oral Tablet 24 Hr Take 1 tablet (50 mg total) by mouth daily.      amLODIPine Besy-Benazepril HCl 2.5-10 MG Oral Cap Take 1 capsule by mouth 2 (two) times daily. 20 mg tab bid      Ascorbic Acid (VITAMIN C OR) Vitamin C 500 mg tablet   Take 1 tablet every day by oral route.      Timolol Maleate 0.5 % Ophthalmic Gel Forming Solution timolol maleate 0.5 % eye gel forming solution      SELENIUM OR selenium 200 mcg tablet   Take 1 tablet every day by oral route.      Vitamin D3 1000 units Oral Tab Vitamin D3 1,000 unit tablet   Take 1 tablet every day by oral route.      aspirin 81 MG Oral Tab Asprin Ec Low Dose 81 mg tablet,delayed release   Take 1 tablet every day by oral route.      MetroNIDAZOLE 0.75 % External Gel        Felodipine ER 5 MG Oral Tablet 24 Hr       hydrochlorothiazide 25 MG Oral Tab Take 1 tablet (25 mg total) by mouth daily.      TraMADol HCl 50 MG Oral Tab Take 1 tablet (50 mg total) by mouth every 6 (six) hours as needed for Pain.      Potassium Chloride ER 8 MEQ Oral Tab CR Take 1 tablet (8 mEq total) by mouth daily.        History reviewed. No pertinent past medical history.   History reviewed. No pertinent surgical history.   History reviewed. No pertinent family history.   Social History     Socioeconomic History    Marital status:    Tobacco Use    Smoking status: Never    Smokeless tobacco: Never   Vaping Use    Vaping status: Never Used   Substance and Sexual Activity    Alcohol use: No    Drug use: No           REVIEW OF SYSTEMS:     Denies nause, fever, chills  No calf pain  Denies chest pain or SOB      EXAM:   There were no vitals taken for this visit.  GENERAL: well developed, well nourished, in no apparent distress  EXTREMITIES:   1. Integument: Normal skin temperature and turgor. Toenails x9 are elongated, thickened and discolored with subungal derbi. HPK to left plantar foot. Right 4th digit toenail is avulsed with erythema and edema to the toe.    2. Vascular: Dorsalis pedis two out of four bilateral and posterior tibial pulses two out of   four bilateral, capillary refill normal.   3. Musculoskeletal: All muscle groups are graded 5 out of 5 in the foot and ankle.    4. Neurological: Normal sharp dull sensation; reflexes normal.             ASSESSMENT AND PLAN:   Diagnoses and all orders for this visit:    Gait instability    Pain in toes of both feet    Callus of foot    Cellulitis of right toe            Plan:       -Patient examined, chart history reviewed.  -Discussed importance of proper pedal hygiene, regular foot checks, and tight glucose control.  -Sharply debrided nails x10 with a sterile nail nipper achieving a 20% reduction in thickness and length, without incident.   -avulsed  right 4th digit toenail was removed and toe was dressed with bacitracin and bandaid. Advised pt to take oral abx as instructed and to keep toe covered by changing it every other day with mupirocin and bandaid. Pt's daughter who was in the room was informed about the dressing changes.  -Pared HPK to left foot with #15 blade with no incident.   -Ambulate with supportive shoes and inserts and avoid walking barefoot.  -Educated patient on acute signs of infection advised patient to seek immediate medical attention if symptoms arise.    RTC in 3 months or sooner if signs of infection       The patient indicates understanding of these issues and agrees to the plan.        Surekha Longoria DPM

## 2025-04-08 ENCOUNTER — OFFICE VISIT (OUTPATIENT)
Dept: PODIATRY CLINIC | Facility: CLINIC | Age: OVER 89
End: 2025-04-08

## 2025-04-08 DIAGNOSIS — M79.674 PAIN IN TOES OF BOTH FEET: ICD-10-CM

## 2025-04-08 DIAGNOSIS — M79.675 PAIN IN TOES OF BOTH FEET: ICD-10-CM

## 2025-04-08 DIAGNOSIS — B35.1 ONYCHOMYCOSIS: ICD-10-CM

## 2025-04-08 DIAGNOSIS — R26.81 GAIT INSTABILITY: Primary | ICD-10-CM

## 2025-04-08 DIAGNOSIS — L84 CALLUS OF FOOT: ICD-10-CM

## 2025-04-08 PROCEDURE — 99213 OFFICE O/P EST LOW 20 MIN: CPT | Performed by: STUDENT IN AN ORGANIZED HEALTH CARE EDUCATION/TRAINING PROGRAM

## 2025-04-08 NOTE — PROGRESS NOTES
[facility] Podiatry  Progress Note      Vikram Covarrubias is a 89 year old male.   Chief Complaint   Patient presents with    Toenail Care     3 m o f/u - LOV with PCP Dr De Anda was in 11/2024 - has bothersome calluses and needs his nails cut also              HPI:       Patient is a pleasant 89-year-old male presents to clinic for bilateral check.  Patient admits elongated toenails and painful callus to his left foot.     Allergies: Demerol hcl [meperidine] and Demerol    Current Outpatient Medications   Medication Sig Dispense Refill    cephALEXin 500 MG Oral Cap Take 1 capsule (500 mg total) by mouth 3 (three) times daily. 21 capsule 0    mupirocin 2 % External Ointment Apply 1 Application topically 2 (two) times daily. 30 g 0    amLODIPine 2.5 MG Oral Tab Take 1 tablet (2.5 mg total) by mouth daily.      hydrALAZINE 25 MG Oral Tab Take 1 tablet (25 mg total) by mouth 2 (two) times daily.      allopurinol 100 MG Oral Tab Take 2 tablets (200 mg total) by mouth daily.      metoprolol succinate ER 25 MG Oral Tablet 24 Hr Take 1 tablet (25 mg total) by mouth daily.      cephalexin 500 MG Oral Cap Take 1 capsule (500 mg total) by mouth 3 (three) times daily. 21 capsule 0    mupirocin 2 % External Ointment Apply 1 Application topically 2 (two) times daily. 30 g 3    amoxicillin clavulanate 875-125 MG Oral Tab Take 1 tablet by mouth 2 (two) times daily. 30 tablet 0    Benazepril HCl 20 MG Oral Tab       metoprolol succinate ER 50 MG Oral Tablet 24 Hr Take 1 tablet (50 mg total) by mouth daily.      amLODIPine Besy-Benazepril HCl 2.5-10 MG Oral Cap Take 1 capsule by mouth 2 (two) times daily. 20 mg tab bid      Ascorbic Acid (VITAMIN C OR) Vitamin C 500 mg tablet   Take 1 tablet every day by oral route.      Timolol Maleate 0.5 % Ophthalmic Gel Forming Solution timolol maleate 0.5 % eye gel forming solution      SELENIUM OR selenium 200 mcg tablet   Take 1 tablet every day by oral route.      Vitamin D3 1000 units  Oral Tab Vitamin D3 1,000 unit tablet   Take 1 tablet every day by oral route.      aspirin 81 MG Oral Tab Asprin Ec Low Dose 81 mg tablet,delayed release   Take 1 tablet every day by oral route.      MetroNIDAZOLE 0.75 % External Gel       Felodipine ER 5 MG Oral Tablet 24 Hr       hydrochlorothiazide 25 MG Oral Tab Take 1 tablet (25 mg total) by mouth daily.      TraMADol HCl 50 MG Oral Tab Take 1 tablet (50 mg total) by mouth every 6 (six) hours as needed for Pain.      Potassium Chloride ER 8 MEQ Oral Tab CR Take 1 tablet (8 mEq total) by mouth daily.        History reviewed. No pertinent past medical history.   History reviewed. No pertinent surgical history.   History reviewed. No pertinent family history.   Social History     Socioeconomic History    Marital status:    Tobacco Use    Smoking status: Never    Smokeless tobacco: Never   Vaping Use    Vaping status: Never Used   Substance and Sexual Activity    Alcohol use: No    Drug use: No           REVIEW OF SYSTEMS:     Denies nause, fever, chills  No calf pain  Denies chest pain or SOB      EXAM:   There were no vitals taken for this visit.  GENERAL: well developed, well nourished, in no apparent distress  EXTREMITIES:   1. Integument: Normal skin temperature and turgor. Toenails x9 are elongated, thickened and discolored with subungal derbi. HPK to left plantar foot.   2. Vascular: Dorsalis pedis two out of four bilateral and posterior tibial pulses two out of   four bilateral, capillary refill normal.   3. Musculoskeletal: All muscle groups are graded 5 out of 5 in the foot and ankle.    4. Neurological: Normal sharp dull sensation; reflexes normal.             ASSESSMENT AND PLAN:   Diagnoses and all orders for this visit:    Gait instability    Pain in toes of both feet    Callus of foot    Onychomycosis            Plan:       -Patient examined, chart history reviewed.  -Discussed importance of proper pedal hygiene, regular foot checks, and  tight glucose control.  -Sharply debrided nails x10 with a sterile nail nipper achieving a 20% reduction in thickness and length, without incident.   -Pared HPK to left foot with #15 blade with no incident.   -Ambulate with supportive shoes and inserts and avoid walking barefoot.  -Educated patient on acute signs of infection advised patient to seek immediate medical attention if symptoms arise.    RTC in 3 months       The patient indicates understanding of these issues and agrees to the plan.        Surekha Longoria DPM

## 2025-06-18 RX ORDER — MUPIROCIN 2 %
1 OINTMENT (GRAM) TOPICAL 2 TIMES DAILY
Qty: 22 G | Refills: 0 | OUTPATIENT
Start: 2025-06-18

## 2025-06-18 NOTE — TELEPHONE ENCOUNTER
Spoke w/ pt's son on pt's home phone.  Calling to see if requested refill of antibiotic ointment, mupirocin was requested by pt.  He will check w/ pt and call back.